# Patient Record
Sex: FEMALE | Race: WHITE
[De-identification: names, ages, dates, MRNs, and addresses within clinical notes are randomized per-mention and may not be internally consistent; named-entity substitution may affect disease eponyms.]

---

## 2017-09-20 ENCOUNTER — HOSPITAL ENCOUNTER (OUTPATIENT)
Dept: HOSPITAL 47 - RADMAMWWP | Age: 47
Discharge: HOME | End: 2017-09-20
Payer: COMMERCIAL

## 2017-09-20 DIAGNOSIS — Z12.31: Primary | ICD-10-CM

## 2017-09-20 DIAGNOSIS — I08.1: ICD-10-CM

## 2017-09-20 PROCEDURE — 77063 BREAST TOMOSYNTHESIS BI: CPT

## 2017-09-20 PROCEDURE — 93306 TTE W/DOPPLER COMPLETE: CPT

## 2017-09-21 NOTE — MM
Reason for exam: screening  (asymptomatic).

Last mammogram was performed 2 years and 4 months ago.



History:

Patient is nulliparous.

Took hormonal contraceptives for 4 years beginning at age 19.



Physical Findings:

A clinical breast exam by your physician is recommended on an annual basis and 

results should be correlated with mammographic findings.



MG 3D Screening Mammo W/Cad

Bilateral CC and MLO view(s) were taken.

Prior study comparison: May 21, 2015, right breast MG work up mamm w CAD RT.  May 

18, 2015, bilateral MG screening mammo w CAD.

There are scattered fibroglandular densities.  No suspicious abnormality.  No 

significant changes when compared with prior studies.





ASSESSMENT: Negative, BI-RAD 1



RECOMMENDATION:

Routine screening mammogram of both breasts in 1 year.

## 2017-09-22 NOTE — ECHOF
Referral Reason:I42.1 hypertrophic obstructive cardiomyopathy



MEASUREMENTS

--------

HEIGHT: 160.0 cm

WEIGHT: 100.7 kg

BP: 136/91

RVIDd:   3.1 cm     (< 3.3)

IVSd:   1.5 cm     (0.6 - 1.1)

LVIDd:   3.9 cm     (3.9 - 5.3)

LVPWd:   1.2 cm     (0.6 - 1.1)

IVSs:   2.1 cm

LVIDs:   2.1 cm

LVPWs:   1.5 cm

LAESV Index (A-L):   25.03 ml/m

Ao Diam:   3.3 cm     (2.0 - 3.7)

AV Cusp:   2.0 cm     (1.5 - 2.6)

LA Diam:   3.1 cm     (2.7 - 3.8)

MV E Kwame:   0.71 m/s

MV DecT:   278 ms

MV A Kwame:   0.60 m/s

MV E/A Ratio:   1.20 

RAP:   5.00 mmHg

RVSP:   12.32 mmHg







FINDINGS

--------

Sinus rhythm.

This was a technically adequate study.

The left ventricular size is normal.   There is mild 

concentric left ventricular hypertrophy.   Overall left 

ventricular systolic function is normal with, an EF 

between 55 - 60 %.   Mild asymmetric septal hypertrophy 

with septal thickness 1.3 - 1.5 cm.

The right ventricle is normal in size and function.

Normal LA  size by volume 22+/-6 ml/m2.

The right atrium is normal in size.

The aortic valve is trileaflet, and appears 

structurally normal. No aortic stenosis or 

regurgitation.

The mitral valve leaflets are mildly thickened.   There 

is trace mitral regurgitation.

Trace tricuspid regurgitation present.   Right 

ventricular systolic pressure is normal at < 35 mmHg.   

There is no evidence of pulmonary hypertension.

The pulmonic valve was not well visualized.

The aortic root size is normal.

Normal inferior vena cava with normal inspiratory 

collapse consistent with estimated right atrial 

pressure of  5 mmHg.

The pericardium is normal.   There is no pericardial 

effusion.



CONCLUSIONS

--------

1. Sinus rhythm.

2. Trace tricuspid regurgitation present.

3. Right ventricular systolic pressure is normal at < 35 

mmHg.

4. There is no evidence of pulmonary hypertension.

5. The pulmonic valve was not well visualized.

6. The aortic root size is normal.

7. There is no pericardial effusion.

8. This was a technically adequate study.

9. The left ventricular size is normal.

10. There is ASYMMETRIC SEPTAL left ventricular hypertrophy 

c/w HCM

11. Overall left ventricular systolic function is normal 

with, an EF between 55 - 60 %.

12. Normal LA size by volume 22+/-6 ml/m2.

13. The aortic valve is trileaflet, and appears 

structurally normal. No aortic stenosis or 

regurgitation.

14. The mitral valve leaflets are mildly thickened.

15. There is trace mitral regurgitation.





SONOGRAPHER: Mukund Zhong RDCS

## 2018-08-13 ENCOUNTER — HOSPITAL ENCOUNTER (OUTPATIENT)
Dept: HOSPITAL 47 - RADUSWWP | Age: 48
Discharge: HOME | End: 2018-08-13
Attending: UROLOGY
Payer: COMMERCIAL

## 2018-08-13 DIAGNOSIS — R31.9: Primary | ICD-10-CM

## 2018-08-13 PROCEDURE — 76770 US EXAM ABDO BACK WALL COMP: CPT

## 2018-08-13 NOTE — US
EXAMINATION TYPE: US kidneys/renal and bladder

 

DATE OF EXAM: 8/13/2018

 

COMPARISON: NONE

 

CLINICAL HISTORY: R31.9 Hematuria.

 

EXAM MEASUREMENTS:

 

Right Kidney:  12.0 x 4.5x 4.0 cm

Left Kidney: 12.6 x 5.3 x 6.3 cm

 

 

Exam limited due to overlying bowel gas

 

Right Kidney: No hydronephrosis or masses seen  

Left Kidney: No hydronephrosis or masses seen  

Bladder: wnl

**Bilateral Jets seen: Yes

 

 

There is no evidence for hydronephrosis at this point in time.  No nephrolithiasis is seen.  No abby
s are identified.  The urinary bladder is anechoic.  Bilateral ureteral jets are seen.

 

 

 

IMPRESSION:

No hydronephrosis or nephrolithiasis. CT urogram could be performed for further evaluation hematuria 
if clinically indicated.

## 2018-10-22 ENCOUNTER — HOSPITAL ENCOUNTER (OUTPATIENT)
Dept: HOSPITAL 47 - RADMAMWWP | Age: 48
Discharge: HOME | End: 2018-10-22
Attending: OBSTETRICS & GYNECOLOGY
Payer: COMMERCIAL

## 2018-10-22 DIAGNOSIS — Z12.31: Primary | ICD-10-CM

## 2018-10-22 PROCEDURE — 77067 SCR MAMMO BI INCL CAD: CPT

## 2018-10-22 PROCEDURE — 77063 BREAST TOMOSYNTHESIS BI: CPT

## 2018-10-23 NOTE — MM
Reason for exam: screening  (asymptomatic).

Last mammogram was performed 1 year and 1 month ago.



History:

Patient is nulliparous.

Took hormonal contraceptives for 4 years beginning at age 19.



Physical Findings:

A clinical breast exam by your physician is recommended on an annual basis and 

results should be correlated with mammographic findings.



MG 3D Screening Mammo W/Cad

Bilateral CC and MLO view(s) were taken.

Prior study comparison: September 20, 2017, bilateral MG 3d screening mammo w/cad.

May 21, 2015, right breast MG work up mamm w CAD RT.

There are scattered fibroglandular densities.  No suspicious abnormality. Left 

cardiac device obscures the left pectoralis muscle.  No significant changes when 

compared with prior studies.





ASSESSMENT: Negative, BI-RAD 1



RECOMMENDATION:

Routine screening mammogram of both breasts in 1 year.

## 2019-07-24 ENCOUNTER — HOSPITAL ENCOUNTER (OUTPATIENT)
Dept: HOSPITAL 47 - LABWHC1 | Age: 49
Discharge: HOME | End: 2019-07-24
Attending: PODIATRIST
Payer: COMMERCIAL

## 2019-07-24 DIAGNOSIS — K74.60: Primary | ICD-10-CM

## 2019-07-24 PROCEDURE — 84460 ALANINE AMINO (ALT) (SGPT): CPT

## 2019-07-24 PROCEDURE — 36415 COLL VENOUS BLD VENIPUNCTURE: CPT

## 2019-07-24 PROCEDURE — 84450 TRANSFERASE (AST) (SGOT): CPT

## 2019-07-25 LAB
ALT SERPL-CCNC: 94 U/L (ref 8–44)
AST SERPL-CCNC: 30 U/L (ref 13–35)

## 2019-08-21 ENCOUNTER — HOSPITAL ENCOUNTER (OUTPATIENT)
Dept: HOSPITAL 47 - ORWHC2ENDO | Age: 49
Discharge: HOME | End: 2019-08-21
Attending: INTERNAL MEDICINE
Payer: COMMERCIAL

## 2019-08-21 VITALS — TEMPERATURE: 98.8 F | RESPIRATION RATE: 16 BRPM

## 2019-08-21 VITALS — HEART RATE: 77 BPM | DIASTOLIC BLOOD PRESSURE: 77 MMHG | SYSTOLIC BLOOD PRESSURE: 126 MMHG

## 2019-08-21 VITALS — BODY MASS INDEX: 41.1 KG/M2

## 2019-08-21 DIAGNOSIS — Z88.0: ICD-10-CM

## 2019-08-21 DIAGNOSIS — N30.10: ICD-10-CM

## 2019-08-21 DIAGNOSIS — Z79.899: ICD-10-CM

## 2019-08-21 DIAGNOSIS — Z95.810: ICD-10-CM

## 2019-08-21 DIAGNOSIS — I42.2: ICD-10-CM

## 2019-08-21 DIAGNOSIS — Z79.1: ICD-10-CM

## 2019-08-21 DIAGNOSIS — Z87.11: ICD-10-CM

## 2019-08-21 DIAGNOSIS — Z12.11: Primary | ICD-10-CM

## 2019-08-21 DIAGNOSIS — I10: ICD-10-CM

## 2019-08-21 PROCEDURE — 84703 CHORIONIC GONADOTROPIN ASSAY: CPT

## 2019-08-21 PROCEDURE — 45378 DIAGNOSTIC COLONOSCOPY: CPT

## 2019-08-21 NOTE — P.PCN
Date of Procedure: 08/21/19


Procedure(s) Performed: 


BRIEF HISTORY: Patient is a 49-year-old pleasant  female scheduled for an 

elective colonoscopy as a part of screening for colorectal neoplasia.





PROCEDURE PERFORMED: Colonoscopy. 





PREOPERATIVE DIAGNOSIS: Screening for colon cancer. 





IV sedation per Anesthesia. 





PROCEDURE: After informed consent was obtained, the patient, was brought into 

the endoscopy unit. IV sedation was administered by Anesthesia under continuous 

monitoring.  Digital rectal examination was normal. Initially the Olympus CF-160

flexible video colonoscope was then inserted in the rectum, gradually advanced 

into the cecum without any difficulty. Careful examination was performed as the 

scope was gradually being withdrawn. Ileocecal valve and the appendiceal orifice

were visualized and appeared normal.  Prep was fair. Mucosa of the cecum, 

ascending colon, transverse colon, descending colon, sigmoid colon, and rectum 

appeared normal. Retroflexion was performed in the rectum and no lesions were 

seen. The patient tolerated the procedure well. 





IMPRESSION: Normal-appearing colon from rectum to cecum with no evidence of 

colorectal neoplasia .





RECOMMENDATIONS:  Findings of this examination were discussed with the patient 

as well as a family.  She was advised to have a repeat screening colonoscopy in 

10 years.

## 2019-10-21 ENCOUNTER — HOSPITAL ENCOUNTER (OUTPATIENT)
Dept: HOSPITAL 47 - LABWHC1 | Age: 49
Discharge: HOME | End: 2019-10-21
Attending: PODIATRIST
Payer: COMMERCIAL

## 2019-10-21 DIAGNOSIS — K74.60: Primary | ICD-10-CM

## 2019-10-21 LAB
ALT SERPL-CCNC: 14 U/L (ref 8–44)
AST SERPL-CCNC: 17 U/L (ref 13–35)

## 2019-10-21 PROCEDURE — 36415 COLL VENOUS BLD VENIPUNCTURE: CPT

## 2019-10-21 PROCEDURE — 84450 TRANSFERASE (AST) (SGOT): CPT

## 2019-10-21 PROCEDURE — 84460 ALANINE AMINO (ALT) (SGPT): CPT

## 2019-11-15 ENCOUNTER — HOSPITAL ENCOUNTER (OUTPATIENT)
Dept: HOSPITAL 47 - RADMAMWWP | Age: 49
Discharge: HOME | End: 2019-11-15
Attending: OBSTETRICS & GYNECOLOGY
Payer: COMMERCIAL

## 2019-11-15 DIAGNOSIS — Z12.31: Primary | ICD-10-CM

## 2019-11-15 PROCEDURE — 77063 BREAST TOMOSYNTHESIS BI: CPT

## 2019-11-15 PROCEDURE — 77067 SCR MAMMO BI INCL CAD: CPT

## 2019-11-18 NOTE — MM
Reason for exam: screening  (asymptomatic).

Last mammogram was performed 1 year and 1 month ago.



History:

Patient is nulliparous.

Took hormonal contraceptives for 4 years beginning at age 19.



Physical Findings:

A clinical breast exam by your physician is recommended on an annual basis and 

results should be correlated with mammographic findings.



MG 3D Screening Mammo W/Cad

Bilateral CC and MLO view(s) were taken.

Prior study comparison: October 22, 2018, bilateral MG 3d screening mammo w/cad.  

September 20, 2017, bilateral MG 3d screening mammo w/cad.

There are scattered fibroglandular densities.  Benign appearing bilateral 

calcifications. No suspicious abnormality. Left cardiac device seen.  No 

significant changes when compared with prior studies.





ASSESSMENT: Benign, BI-RAD 2



RECOMMENDATION:

Routine screening mammogram of both breasts in 1 year.